# Patient Record
Sex: FEMALE | Race: BLACK OR AFRICAN AMERICAN | NOT HISPANIC OR LATINO | Employment: OTHER | URBAN - METROPOLITAN AREA
[De-identification: names, ages, dates, MRNs, and addresses within clinical notes are randomized per-mention and may not be internally consistent; named-entity substitution may affect disease eponyms.]

---

## 2018-01-12 ENCOUNTER — LAB VISIT (OUTPATIENT)
Dept: LAB | Facility: HOSPITAL | Age: 68
End: 2018-01-12
Attending: PHYSICIAN ASSISTANT
Payer: MEDICARE

## 2018-01-12 ENCOUNTER — OFFICE VISIT (OUTPATIENT)
Dept: INTERNAL MEDICINE | Facility: CLINIC | Age: 68
End: 2018-01-12
Payer: MEDICARE

## 2018-01-12 ENCOUNTER — TELEPHONE (OUTPATIENT)
Dept: INTERNAL MEDICINE | Facility: CLINIC | Age: 68
End: 2018-01-12

## 2018-01-12 VITALS
HEART RATE: 89 BPM | BODY MASS INDEX: 31.38 KG/M2 | HEIGHT: 67 IN | TEMPERATURE: 97 F | OXYGEN SATURATION: 98 % | DIASTOLIC BLOOD PRESSURE: 74 MMHG | SYSTOLIC BLOOD PRESSURE: 122 MMHG | WEIGHT: 199.94 LBS

## 2018-01-12 DIAGNOSIS — N89.8 VAGINAL ITCHING: ICD-10-CM

## 2018-01-12 DIAGNOSIS — N89.8 VAGINAL ITCHING: Primary | ICD-10-CM

## 2018-01-12 PROBLEM — L30.9 ECZEMA: Status: ACTIVE | Noted: 2018-01-12

## 2018-01-12 PROBLEM — E66.9 OBESITY: Status: ACTIVE | Noted: 2018-01-12

## 2018-01-12 LAB
BILIRUB UR QL STRIP: NEGATIVE
CLARITY UR: ABNORMAL
COLOR UR: YELLOW
GLUCOSE UR QL STRIP: NEGATIVE
HGB UR QL STRIP: ABNORMAL
KETONES UR QL STRIP: NEGATIVE
LEUKOCYTE ESTERASE UR QL STRIP: NEGATIVE
NITRITE UR QL STRIP: NEGATIVE
PH UR STRIP: 6 [PH] (ref 5–8)
PROT UR QL STRIP: NEGATIVE
SP GR UR STRIP: >=1.03 (ref 1–1.03)
URN SPEC COLLECT METH UR: ABNORMAL

## 2018-01-12 PROCEDURE — 99213 OFFICE O/P EST LOW 20 MIN: CPT | Mod: PBBFAC,PO | Performed by: FAMILY MEDICINE

## 2018-01-12 PROCEDURE — 99999 PR PBB SHADOW E&M-EST. PATIENT-LVL III: CPT | Mod: PBBFAC,,, | Performed by: FAMILY MEDICINE

## 2018-01-12 PROCEDURE — 81003 URINALYSIS AUTO W/O SCOPE: CPT | Mod: PO

## 2018-01-12 PROCEDURE — 99203 OFFICE O/P NEW LOW 30 MIN: CPT | Mod: S$PBB,,, | Performed by: FAMILY MEDICINE

## 2018-01-12 RX ORDER — LOSARTAN POTASSIUM AND HYDROCHLOROTHIAZIDE 12.5; 1 MG/1; MG/1
1 TABLET ORAL
COMMUNITY
Start: 2017-06-05

## 2018-01-12 RX ORDER — HALOBETASOL PROPIONATE 0.5 MG/G
OINTMENT TOPICAL
COMMUNITY
Start: 2017-07-17

## 2018-01-12 RX ORDER — NYSTATIN 500000 [USP'U]/1
TABLET, COATED ORAL
COMMUNITY
Start: 2017-08-21

## 2018-01-12 RX ORDER — METRONIDAZOLE 500 MG/1
500 TABLET ORAL 2 TIMES DAILY
Qty: 14 TABLET | Refills: 0 | Status: SHIPPED | OUTPATIENT
Start: 2018-01-12 | End: 2018-01-19

## 2018-01-12 RX ORDER — METFORMIN HYDROCHLORIDE 750 MG/1
TABLET, EXTENDED RELEASE ORAL
COMMUNITY
Start: 2017-07-17

## 2018-01-12 NOTE — PROGRESS NOTES
Subjective:   Patient ID: Oneida Berrios is a 67 y.o. female.  Chief Complaint:  Vaginal Itching (with odor)      PCP Dr. Culp.  Presents for more urgent care visit with complaint of vaginal itching.  Medical history for diabetes, hypertension and eczema.  Reports previous episode in her 30s of bacterial vaginosis, this feels the same.      Vaginal Itching   The patient's primary symptoms include genital itching, a genital odor and vaginal discharge. The patient's pertinent negatives include no genital lesions, genital rash, pelvic pain or vaginal bleeding. This is a new problem. The current episode started 1 to 4 weeks ago. The problem occurs constantly. The problem has been unchanged. She is not pregnant. Pertinent negatives include no abdominal pain, anorexia, back pain, chills, constipation, diarrhea, discolored urine, dysuria, fever, flank pain, frequency, headaches, hematuria, joint pain, joint swelling, nausea, rash, sore throat, urgency or vomiting. The vaginal discharge was scant, white and malodorous. There has been no bleeding. Nothing aggravates the symptoms. She has tried nothing for the symptoms. She is not sexually active. No, her partner does not have an STD. She is postmenopausal. Her past medical history is significant for vaginosis. There is no history of PID or an STD.       Current Outpatient Prescriptions:     halobetasol (ULTRAVATE) 0.05 % ointment, APPLY TOPICALLY TO AFFECTED AREA TWICE DAILY, Disp: , Rfl:     losartan-hydrochlorothiazide 100-12.5 mg (HYZAAR) 100-12.5 mg Tab, Take 1 tablet by mouth., Disp: , Rfl:     metFORMIN (GLUCOPHAGE-XR) 750 MG 24 hr tablet, TAKE TWO TABLETS BY MOUTH ONCE DAILY IN THE EVENING WITH  SUPPER, Disp: , Rfl:     nystatin (MYCOSTATIN) 500,000 unit Tab, TAKE ONE TABLET BY MOUTH ONCE DAILY, Disp: , Rfl:      Review of Systems   Constitutional: Negative for chills and fever.   HENT: Negative for sore throat.    Respiratory: Negative for shortness of breath.  "   Cardiovascular: Negative for leg swelling.   Gastrointestinal: Negative for abdominal pain, anorexia, constipation, diarrhea, nausea and vomiting.   Genitourinary: Positive for vaginal discharge. Negative for difficulty urinating, dysuria, flank pain, frequency, hematuria, pelvic pain and urgency.   Musculoskeletal: Negative for back pain and joint pain.   Skin: Negative for rash.   Neurological: Negative for headaches.   Psychiatric/Behavioral: Negative for sleep disturbance. The patient is nervous/anxious.      Objective:   /74 (BP Location: Right arm, Patient Position: Sitting, BP Method: Large (Manual))   Pulse 89   Temp 97.1 °F (36.2 °C) (Tympanic)   Ht 5' 7" (1.702 m)   Wt 90.7 kg (199 lb 15.3 oz)   SpO2 98%   BMI 31.32 kg/m²     Physical Exam   Constitutional: She appears well-developed and well-nourished.   Cardiovascular: Normal rate, regular rhythm and normal heart sounds.  Exam reveals no gallop and no friction rub.    No murmur heard.  Pulmonary/Chest: Effort normal and breath sounds normal. She has no wheezes. She has no rales.   Abdominal: Soft. Bowel sounds are normal. She exhibits no distension. There is no hepatosplenomegaly. There is tenderness in the suprapubic area. There is no rebound, no guarding and no CVA tenderness.   Musculoskeletal: She exhibits no edema.   Skin: Skin is warm and dry. No rash noted.   Nursing note and vitals reviewed.    Assessment:     1. Vaginal itching      Plan:   Vaginal itching  -     Urinalysis; Future; Expected date: 01/12/2018    Based on history bacterial vaginosis most likely.  Doubt gonorrhea, chlamydia, trichomonas.  Not sexually active.  No previous STDs.  Check urinalysis.  If yeast present treat with Diflucan/Lotrisone.  If yeast absent, treat with Flagyl.  Discussed if no improvement with treatment, needs to see GYN for pelvic exam.  Patient expresses understanding and agrees to the above plan.    "

## 2018-01-12 NOTE — TELEPHONE ENCOUNTER
----- Message from Salinas Ochoa MD sent at 1/12/2018 12:44 PM CST -----  Urine very concentrated.  Needs to drink more water.  No suspicion of urinary tract infection.  Sent Flagyl for bacterial vaginosis as discussed.